# Patient Record
Sex: MALE | Race: OTHER | Employment: FULL TIME | URBAN - METROPOLITAN AREA
[De-identification: names, ages, dates, MRNs, and addresses within clinical notes are randomized per-mention and may not be internally consistent; named-entity substitution may affect disease eponyms.]

---

## 2021-06-05 ENCOUNTER — HOSPITAL ENCOUNTER (EMERGENCY)
Age: 51
Discharge: HOME | End: 2021-06-05
Attending: EMERGENCY MEDICINE

## 2021-06-05 VITALS
RESPIRATION RATE: 16 BRPM | TEMPERATURE: 98 F | SYSTOLIC BLOOD PRESSURE: 124 MMHG | OXYGEN SATURATION: 96 % | DIASTOLIC BLOOD PRESSURE: 82 MMHG | HEART RATE: 78 BPM

## 2021-06-05 DIAGNOSIS — S05.02XA ABRASION OF LEFT CONJUNCTIVA, INITIAL ENCOUNTER: Primary | ICD-10-CM

## 2021-06-05 PROCEDURE — 74011250637 HC RX REV CODE- 250/637: Performed by: NURSE PRACTITIONER

## 2021-06-05 PROCEDURE — 99283 EMERGENCY DEPT VISIT LOW MDM: CPT

## 2021-06-05 PROCEDURE — 74011000250 HC RX REV CODE- 250: Performed by: NURSE PRACTITIONER

## 2021-06-05 RX ORDER — ERYTHROMYCIN 5 MG/G
OINTMENT OPHTHALMIC
Qty: 1 G | Refills: 0 | Status: SHIPPED | OUTPATIENT
Start: 2021-06-05

## 2021-06-05 RX ORDER — ERYTHROMYCIN 5 MG/G
OINTMENT OPHTHALMIC
Status: COMPLETED | OUTPATIENT
Start: 2021-06-05 | End: 2021-06-05

## 2021-06-05 RX ORDER — TETRACAINE HYDROCHLORIDE 5 MG/ML
1 SOLUTION OPHTHALMIC
Status: COMPLETED | OUTPATIENT
Start: 2021-06-05 | End: 2021-06-05

## 2021-06-05 RX ADMIN — FLUORESCEIN SODIUM 1 STRIP: 1 STRIP OPHTHALMIC at 15:59

## 2021-06-05 RX ADMIN — TETRACAINE HYDROCHLORIDE 1 DROP: 5 SOLUTION OPHTHALMIC at 15:59

## 2021-06-05 RX ADMIN — ERYTHROMYCIN: 5 OINTMENT OPHTHALMIC at 16:15

## 2021-06-05 NOTE — ED TRIAGE NOTES
Pt ambulatory to triage. Pt requires . Pt states he has red sclera in the left eye since yesterday with crusting when waking. Pt states vision blurry in left eye. Denies injury to eye.

## 2021-06-05 NOTE — ED PROVIDER NOTES
75-year-old male who presents today with complaint of left eye redness. Patient states symptoms began on Thursday night. He states he is like something is stuck in his eye. Patient denies knowledge of something getting in his eye. Patient reports tearing of his eye but denies other discharge. Patient reports that his vision looks blurry in his eye but if he blinks the blurriness is cleared. The history is provided by the patient. A  was used. Eye Pain   This is a new problem. The current episode started more than 2 days ago. The problem has not changed since onset. The injury mechanism was none. There is no history of trauma to the eye. There is no known exposure to pink eye. He does not wear contacts. Associated symptoms include blurred vision, foreign body sensation, eye redness and pain. Pertinent negatives include no decreased vision, no discharge, no double vision, no photophobia, no nausea, no vomiting, no fever, no head injury and no dizziness. He has tried nothing for the symptoms. History reviewed. No pertinent past medical history. History reviewed. No pertinent surgical history. History reviewed. No pertinent family history.     Social History     Socioeconomic History    Marital status:      Spouse name: Not on file    Number of children: Not on file    Years of education: Not on file    Highest education level: Not on file   Occupational History    Not on file   Tobacco Use    Smoking status: Not on file   Substance and Sexual Activity    Alcohol use: Not on file    Drug use: Not on file    Sexual activity: Not on file   Other Topics Concern    Not on file   Social History Narrative    Not on file     Social Determinants of Health     Financial Resource Strain:     Difficulty of Paying Living Expenses:    Food Insecurity:     Worried About Running Out of Food in the Last Year:     [address] in the Last Year:    Transportation Needs:  Lack of Transportation (Medical):  Lack of Transportation (Non-Medical):    Physical Activity:     Days of Exercise per Week:     Minutes of Exercise per Session:    Stress:     Feeling of Stress :    Social Connections:     Frequency of Communication with Friends and Family:     Frequency of Social Gatherings with Friends and Family:     Attends [employer] Services:     Active Member of Clubs or Organizations:     Attends Club or Organization Meetings:     Marital Status:    Intimate Partner Violence:     Fear of Current or Ex-Partner:     Emotionally Abused:     Physically Abused:     Sexually Abused: ALLERGIES: Patient has no known allergies. Review of Systems   Constitutional: Negative for fever. Eyes: Positive for blurred vision, pain and redness. Negative for double vision, photophobia and discharge. Gastrointestinal: Negative for nausea and vomiting. Neurological: Negative for dizziness and headaches. All other systems reviewed and are negative. Vitals:    06/05/21 1529 06/05/21 1633   BP: 113/71 124/82   Pulse: 83 78   Resp: 16 16   Temp: 97.7 °F (36.5 °C) 98 °F (36.7 °C)   SpO2: 95% 96%            Physical Exam  Vitals and nursing note reviewed. Constitutional:       General: He is not in acute distress. Appearance: Normal appearance. He is normal weight. He is not ill-appearing, toxic-appearing or diaphoretic. HENT:      Head: Normocephalic and atraumatic. Right Ear: External ear normal.      Left Ear: External ear normal.      Nose: Nose normal.      Mouth/Throat:      Mouth: Mucous membranes are moist.      Pharynx: Oropharynx is clear. Eyes:      General: Lids are normal. Lids are everted, no foreign bodies appreciated. Vision grossly intact. Gaze aligned appropriately. No allergic shiner. Left eye: No foreign body or discharge. Intraocular pressure: Right eye pressure is 16 mmHg. Left eye pressure is 16 mmHg.  Measurements were taken using a handheld tonometer. Extraocular Movements: Extraocular movements intact. Right eye: Normal extraocular motion and no nystagmus. Left eye: Normal extraocular motion and no nystagmus. Conjunctiva/sclera:      Left eye: Left conjunctiva is injected. No exudate. Pupils: Pupils are equal, round, and reactive to light. Left eye: Marion exam negative. Slit lamp exam:     Right eye: Anterior chamber quiet. Left eye: Anterior chamber quiet. No foreign body. Comments: Fluorescein uptake noted at 7:00 on sclera. No uptake to cornea. No corneal abrasions on exam.  Intraocular pressure measured using [employer]open. Cardiovascular:      Rate and Rhythm: Normal rate. Pulses: Normal pulses. Pulmonary:      Effort: Pulmonary effort is normal. No respiratory distress. Abdominal:      General: Abdomen is flat. There is no distension. Musculoskeletal:         General: Normal range of motion. Cervical back: Normal range of motion. No rigidity. Right lower leg: No edema. Left lower leg: No edema. Skin:     General: Skin is warm and dry. Capillary Refill: Capillary refill takes less than 2 seconds. Neurological:      General: No focal deficit present. Mental Status: He is alert and oriented to person, place, and time. Psychiatric:         Mood and Affect: Mood normal.         Behavior: Behavior normal.         Thought Content: Thought content normal.         Judgment: Judgment normal.          MDM  Number of Diagnoses or Management Options  Abrasion of left conjunctiva, initial encounter  Diagnosis management comments: 40-year-old male presents today with complaint of left eye redness and tearing. Patient reports symptoms began on Thursday. Positive foreign body sensation. Eyelids everted. No foreign bodies on exam.  Fluorescein uptake at 7:00 only to sclera. Moderate redness noted to the conjunctiva.   Will treat with erythromycin ophthalmic ointment. Ophthalmology information provided. I have discussed the results of all labs, procedures, radiographs, and/or treatments with the patient and available family members. Zenaida Lowell is agreed upon by the patient and the patient is ready for discharge.  Questions about treatment in the ED and differential diagnosis of presenting condition were answered. Collette Jarrell was given verbal discharge instructions including, but not limited to, importance of returning to the emergency department for any concern of worsening or continued symptoms.  Instructions were given to follow up with a primary care provider or specialist within 3 days if he is not improving. Risk of Complications, Morbidity, and/or Mortality  Presenting problems: low  Diagnostic procedures: low  Management options: low    Patient Progress  Patient progress: improved         Eye Stain      Date/Time: 6/5/2021 4:40 PM            Corneal abrasion was not present on eyelid eversion. Cornea is clear. Anterior chamber is clear. Patient tolerance: patient tolerated the procedure well with no immediate complications  My total time at bedside, performing this procedure was 1-15 minutes. Comments: Uptake noted at 7:00 on conjunctiva. No fluorescein uptake to cornea. No evidence of corneal abrasion. Marion sign negative.

## 2021-06-05 NOTE — ED NOTES
I have reviewed discharge instructions with the patient. The patient verbalized understanding. Patient left ED via Discharge Method: ambulatory to Home with self. Opportunity for questions and clarification provided. Patient given 1 scripts. To continue your aftercare when you leave the hospital, you may receive an automated call from our care team to check in on how you are doing. This is a free service and part of our promise to provide the best care and service to meet your aftercare needs.  If you have questions, or wish to unsubscribe from this service please call [phone]. Thank you for Choosing our [facility].

## 2023-05-19 NOTE — DISCHARGE INSTRUCTIONS
P Quality Flow/Interdisciplinary Rounds Progress Note        Quality Flow Rounds held on May 19, 2023    Disciplines Attending:  Bedside Nurse, , , and Nursing Unit Leadership    Néstor Harp was admitted on 5/16/2023 12:27 AM    Anticipated Discharge Date:       Disposition:    Alvin Score:  Alvin Scale Score: 20    Readmission Risk              Risk of Unplanned Readmission:  20           Discussed patient goal for the day, patient clinical progression, and barriers to discharge.   The following Goal(s) of the Day/Commitment(s) have been identified:  discharge Solitario Ochoa, RN  May 19, 2023 Use ointment as directed. Follow-up with ophthalmology in 3 days if not improving. Return to the ER for any new, worsening, or concerning symptoms.